# Patient Record
Sex: FEMALE | Race: WHITE | NOT HISPANIC OR LATINO | URBAN - METROPOLITAN AREA
[De-identification: names, ages, dates, MRNs, and addresses within clinical notes are randomized per-mention and may not be internally consistent; named-entity substitution may affect disease eponyms.]

---

## 2017-12-28 NOTE — PATIENT DISCUSSION
(P36.585) Other secondary cataract, bilateral - Assesment : Mild posterior capsule opacification present OU. OD>OS minimally. - Plan : Monitor for changes. Advised patient to call our office with decreased vision or increased symptoms. RV in 1 Year for Complete Exam, sooner with problems or changes.

## 2019-01-07 NOTE — PATIENT DISCUSSION
(W54.973) Other secondary cataract, bilateral - Assesment : Significant posterior capsule opacification present OD>OS. Patient is symptomatic with visual function affected. - Plan : Discussed that opacity is the cause of the decreased vision. Discussed the risks and benefits of performing a YAG Capsulotomy including the risk of floaters, retinal detachment, and retinal swelling. Patient understands to expect floaters after the YAG capsulotomy procedures and understands that they may remain indefinitely.  Recommend that patient undergo a YAG Capsulotomy OU (Both Eyes)

## 2019-02-05 NOTE — PATIENT DISCUSSION
(V85.707) Other secondary cataract, bilateral - Assesment : Significant posterior capsule opacification s/p YAG OU. Patient is happy with his present level of vision - Plan : Monitor for changes. Advised patient to call our office with decreased vision or an increase in symptoms.     december exam

## 2022-03-11 ENCOUNTER — ESTABLISHED PATIENT (OUTPATIENT)
Dept: URBAN - METROPOLITAN AREA CLINIC 20 | Facility: CLINIC | Age: 67
End: 2022-03-11

## 2022-03-11 DIAGNOSIS — H44.702: ICD-10-CM

## 2022-03-11 DIAGNOSIS — H04.123: ICD-10-CM

## 2022-03-11 PROCEDURE — 99213 OFFICE O/P EST LOW 20 MIN: CPT

## 2022-03-11 ASSESSMENT — TONOMETRY
OS_IOP_MMHG: 9
OD_IOP_MMHG: 13

## 2022-03-11 ASSESSMENT — VISUAL ACUITY
OD_SC: 20/25-1
OS_SC: 20/40

## 2022-07-18 ENCOUNTER — ESTABLISHED PATIENT (OUTPATIENT)
Dept: URBAN - METROPOLITAN AREA CLINIC 20 | Facility: CLINIC | Age: 67
End: 2022-07-18

## 2022-07-18 DIAGNOSIS — H26.491: ICD-10-CM

## 2022-07-18 PROCEDURE — 99214 OFFICE O/P EST MOD 30 MIN: CPT

## 2022-07-18 ASSESSMENT — TONOMETRY
OS_IOP_MMHG: 13
OD_IOP_MMHG: 12

## 2022-07-18 ASSESSMENT — VISUAL ACUITY
OD_SC: 20/30+1
OU_SC: 20/20
OS_SC: 20/25-1

## 2022-09-13 ENCOUNTER — EMERGENCY VISIT (OUTPATIENT)
Dept: URBAN - METROPOLITAN AREA CLINIC 21 | Facility: CLINIC | Age: 67
End: 2022-09-13

## 2022-09-13 DIAGNOSIS — H16.223: ICD-10-CM

## 2022-09-13 PROCEDURE — 92012 INTRM OPH EXAM EST PATIENT: CPT

## 2022-09-13 ASSESSMENT — VISUAL ACUITY
OS_SC: 20/25
OD_SC: 20/30

## 2023-11-27 ENCOUNTER — ESTABLISHED PATIENT (OUTPATIENT)
Dept: URBAN - METROPOLITAN AREA CLINIC 20 | Facility: CLINIC | Age: 68
End: 2023-11-27

## 2023-11-27 DIAGNOSIS — H16.223: ICD-10-CM

## 2023-11-27 PROCEDURE — 99213 OFFICE O/P EST LOW 20 MIN: CPT

## 2023-11-27 ASSESSMENT — TONOMETRY
OS_IOP_MMHG: 11
OD_IOP_MMHG: 12

## 2023-11-27 ASSESSMENT — KERATOMETRY
OD_K1POWER_DIOPTERS: 45.00
OD_AXISANGLE_DEGREES: 96
OS_K1POWER_DIOPTERS: 45.50
OD_AXISANGLE2_DEGREES: 6
OS_K2POWER_DIOPTERS: 46.25
OS_AXISANGLE2_DEGREES: 169
OS_AXISANGLE_DEGREES: 79
OD_K2POWER_DIOPTERS: 45.75

## 2023-11-27 ASSESSMENT — VISUAL ACUITY
OU_SC: 20/20
OS_SC: 20/25-2
OD_SC: 20/20-1

## 2024-08-29 ENCOUNTER — FOLLOW UP (OUTPATIENT)
Dept: URBAN - METROPOLITAN AREA CLINIC 20 | Facility: CLINIC | Age: 69
End: 2024-08-29

## 2024-08-29 DIAGNOSIS — H16.223: ICD-10-CM

## 2024-08-29 PROCEDURE — 99214 OFFICE O/P EST MOD 30 MIN: CPT

## 2024-08-29 ASSESSMENT — TONOMETRY
OD_IOP_MMHG: 12
OS_IOP_MMHG: 10

## 2024-08-29 ASSESSMENT — VISUAL ACUITY
OS_SC: 20/30
OD_SC: 20/25-1
OU_SC: 20/20

## 2024-08-29 ASSESSMENT — KERATOMETRY
OD_K1POWER_DIOPTERS: 45.00
OD_AXISANGLE_DEGREES: 96
OS_AXISANGLE_DEGREES: 79
OS_AXISANGLE2_DEGREES: 169
OD_AXISANGLE2_DEGREES: 6
OS_K2POWER_DIOPTERS: 46.25
OD_K2POWER_DIOPTERS: 45.75
OS_K1POWER_DIOPTERS: 45.50